# Patient Record
Sex: FEMALE | Race: WHITE | Employment: UNEMPLOYED | URBAN - METROPOLITAN AREA
[De-identification: names, ages, dates, MRNs, and addresses within clinical notes are randomized per-mention and may not be internally consistent; named-entity substitution may affect disease eponyms.]

---

## 2020-08-04 ENCOUNTER — APPOINTMENT (OUTPATIENT)
Dept: GENERAL RADIOLOGY | Age: 35
End: 2020-08-04
Attending: EMERGENCY MEDICINE
Payer: COMMERCIAL

## 2020-08-04 ENCOUNTER — HOSPITAL ENCOUNTER (EMERGENCY)
Age: 35
Discharge: HOME | End: 2020-08-04
Attending: EMERGENCY MEDICINE
Payer: COMMERCIAL

## 2020-08-04 VITALS
WEIGHT: 183 LBS | OXYGEN SATURATION: 100 % | TEMPERATURE: 98.4 F | SYSTOLIC BLOOD PRESSURE: 166 MMHG | DIASTOLIC BLOOD PRESSURE: 92 MMHG | RESPIRATION RATE: 22 BRPM | HEIGHT: 70 IN | HEART RATE: 91 BPM | BODY MASS INDEX: 26.2 KG/M2

## 2020-08-04 DIAGNOSIS — E87.3 METABOLIC ALKALOSIS: ICD-10-CM

## 2020-08-04 DIAGNOSIS — E87.1 HYPONATREMIA: ICD-10-CM

## 2020-08-04 DIAGNOSIS — R11.2 NAUSEA AND VOMITING, INTRACTABILITY OF VOMITING NOT SPECIFIED, UNSPECIFIED VOMITING TYPE: ICD-10-CM

## 2020-08-04 DIAGNOSIS — K31.84 GASTROPARESIS: Primary | ICD-10-CM

## 2020-08-04 LAB
ALBUMIN SERPL-MCNC: 4 G/DL (ref 3.5–5)
ALBUMIN/GLOB SERPL: 0.6 {RATIO} (ref 1.1–2.2)
ALP SERPL-CCNC: 143 U/L (ref 45–117)
ALT SERPL-CCNC: 32 U/L (ref 12–78)
ANION GAP SERPL CALC-SCNC: 9 MMOL/L (ref 5–15)
ARTERIAL PATENCY WRIST A: ABNORMAL
AST SERPL-CCNC: 29 U/L (ref 15–37)
BASE EXCESS BLD CALC-SCNC: 12 MMOL/L
BASOPHILS # BLD: 0 K/UL (ref 0–0.1)
BASOPHILS NFR BLD: 0 % (ref 0–1)
BDY SITE: ABNORMAL
BILIRUB SERPL-MCNC: 1.1 MG/DL (ref 0.2–1)
BUN SERPL-MCNC: 31 MG/DL (ref 6–20)
BUN/CREAT SERPL: 17 (ref 12–20)
CA-I BLD-SCNC: 1.07 MMOL/L (ref 1.12–1.32)
CALCIUM SERPL-MCNC: 10.4 MG/DL (ref 8.5–10.1)
CHLORIDE SERPL-SCNC: 89 MMOL/L (ref 97–108)
CO2 SERPL-SCNC: 30 MMOL/L (ref 21–32)
CREAT SERPL-MCNC: 1.8 MG/DL (ref 0.55–1.02)
DIFFERENTIAL METHOD BLD: ABNORMAL
EOSINOPHIL # BLD: 0 K/UL (ref 0–0.4)
EOSINOPHIL NFR BLD: 0 % (ref 0–7)
ERYTHROCYTE [DISTWIDTH] IN BLOOD BY AUTOMATED COUNT: 13.2 % (ref 11.5–14.5)
GAS FLOW.O2 O2 DELIVERY SYS: ABNORMAL L/MIN
GLOBULIN SER CALC-MCNC: 6.2 G/DL (ref 2–4)
GLUCOSE BLD STRIP.AUTO-MCNC: 269 MG/DL (ref 65–100)
GLUCOSE SERPL-MCNC: 260 MG/DL (ref 65–100)
HCO3 BLD-SCNC: 33.3 MMOL/L (ref 22–26)
HCT VFR BLD AUTO: 39.2 % (ref 35–47)
HGB BLD-MCNC: 13.5 G/DL (ref 11.5–16)
IMM GRANULOCYTES # BLD AUTO: 0.1 K/UL (ref 0–0.04)
IMM GRANULOCYTES NFR BLD AUTO: 1 % (ref 0–0.5)
INR PPP: 1 (ref 0.9–1.1)
LACTATE SERPL-SCNC: 2.8 MMOL/L (ref 0.4–2)
LIPASE SERPL-CCNC: 57 U/L (ref 73–393)
LYMPHOCYTES # BLD: 1.5 K/UL (ref 0.8–3.5)
LYMPHOCYTES NFR BLD: 15 % (ref 12–49)
MCH RBC QN AUTO: 30.4 PG (ref 26–34)
MCHC RBC AUTO-ENTMCNC: 34.4 G/DL (ref 30–36.5)
MCV RBC AUTO: 88.3 FL (ref 80–99)
MONOCYTES # BLD: 0.5 K/UL (ref 0–1)
MONOCYTES NFR BLD: 5 % (ref 5–13)
NEUTS SEG # BLD: 7.9 K/UL (ref 1.8–8)
NEUTS SEG NFR BLD: 79 % (ref 32–75)
NRBC # BLD: 0 K/UL (ref 0–0.01)
NRBC BLD-RTO: 0 PER 100 WBC
PCO2 BLD: 32 MMHG (ref 35–45)
PH BLD: 7.63 [PH] (ref 7.35–7.45)
PLATELET # BLD AUTO: 524 K/UL (ref 150–400)
PMV BLD AUTO: 9.4 FL (ref 8.9–12.9)
PO2 BLD: 25 MMHG (ref 80–100)
POTASSIUM SERPL-SCNC: 4 MMOL/L (ref 3.5–5.1)
PROT SERPL-MCNC: 10.2 G/DL (ref 6.4–8.2)
PROTHROMBIN TIME: 10.9 SEC (ref 9–11.1)
RBC # BLD AUTO: 4.44 M/UL (ref 3.8–5.2)
SAO2 % BLD: 61 % (ref 92–97)
SERVICE CMNT-IMP: ABNORMAL
SODIUM SERPL-SCNC: 128 MMOL/L (ref 136–145)
SPECIMEN TYPE: ABNORMAL
TOTAL RESP. RATE, ITRR: 29
TROPONIN I SERPL-MCNC: <0.05 NG/ML
WBC # BLD AUTO: 10 K/UL (ref 3.6–11)

## 2020-08-04 PROCEDURE — 82803 BLOOD GASES ANY COMBINATION: CPT

## 2020-08-04 PROCEDURE — 82962 GLUCOSE BLOOD TEST: CPT

## 2020-08-04 PROCEDURE — 74011000250 HC RX REV CODE- 250: Performed by: EMERGENCY MEDICINE

## 2020-08-04 PROCEDURE — 74011250636 HC RX REV CODE- 250/636

## 2020-08-04 PROCEDURE — 74011250636 HC RX REV CODE- 250/636: Performed by: EMERGENCY MEDICINE

## 2020-08-04 PROCEDURE — 96361 HYDRATE IV INFUSION ADD-ON: CPT

## 2020-08-04 PROCEDURE — 99285 EMERGENCY DEPT VISIT HI MDM: CPT

## 2020-08-04 PROCEDURE — 74011250637 HC RX REV CODE- 250/637: Performed by: EMERGENCY MEDICINE

## 2020-08-04 PROCEDURE — 83690 ASSAY OF LIPASE: CPT

## 2020-08-04 PROCEDURE — 85610 PROTHROMBIN TIME: CPT

## 2020-08-04 PROCEDURE — 96374 THER/PROPH/DIAG INJ IV PUSH: CPT

## 2020-08-04 PROCEDURE — 36415 COLL VENOUS BLD VENIPUNCTURE: CPT

## 2020-08-04 PROCEDURE — 85025 COMPLETE CBC W/AUTO DIFF WBC: CPT

## 2020-08-04 PROCEDURE — 83605 ASSAY OF LACTIC ACID: CPT

## 2020-08-04 PROCEDURE — 71045 X-RAY EXAM CHEST 1 VIEW: CPT

## 2020-08-04 PROCEDURE — 84484 ASSAY OF TROPONIN QUANT: CPT

## 2020-08-04 PROCEDURE — 93005 ELECTROCARDIOGRAM TRACING: CPT

## 2020-08-04 PROCEDURE — C9113 INJ PANTOPRAZOLE SODIUM, VIA: HCPCS | Performed by: EMERGENCY MEDICINE

## 2020-08-04 PROCEDURE — 80053 COMPREHEN METABOLIC PANEL: CPT

## 2020-08-04 PROCEDURE — 96372 THER/PROPH/DIAG INJ SC/IM: CPT

## 2020-08-04 RX ORDER — CLONIDINE HYDROCHLORIDE 0.1 MG/1
0.1 TABLET ORAL 2 TIMES DAILY
COMMUNITY

## 2020-08-04 RX ORDER — PANTOPRAZOLE SODIUM 40 MG/1
40 TABLET, DELAYED RELEASE ORAL DAILY
COMMUNITY

## 2020-08-04 RX ORDER — HALOPERIDOL 5 MG/ML
INJECTION INTRAMUSCULAR
Status: COMPLETED
Start: 2020-08-04 | End: 2020-08-04

## 2020-08-04 RX ORDER — FAMOTIDINE 20 MG/1
20 TABLET, FILM COATED ORAL
Status: DISCONTINUED | OUTPATIENT
Start: 2020-08-04 | End: 2020-08-04 | Stop reason: HOSPADM

## 2020-08-04 RX ORDER — INSULIN ASPART 100 [IU]/ML
INJECTION, SOLUTION INTRAVENOUS; SUBCUTANEOUS
COMMUNITY

## 2020-08-04 RX ORDER — METOCLOPRAMIDE 10 MG/1
10 TABLET ORAL
COMMUNITY

## 2020-08-04 RX ORDER — PROMETHAZINE HYDROCHLORIDE 50 MG/1
50 SUPPOSITORY RECTAL
Qty: 12 SUPPOSITORY | Refills: 0 | Status: SHIPPED | OUTPATIENT
Start: 2020-08-04

## 2020-08-04 RX ORDER — HALOPERIDOL 5 MG/ML
5 INJECTION INTRAMUSCULAR
Status: COMPLETED | OUTPATIENT
Start: 2020-08-04 | End: 2020-08-04

## 2020-08-04 RX ORDER — INSULIN GLARGINE 100 [IU]/ML
INJECTION, SOLUTION SUBCUTANEOUS
COMMUNITY

## 2020-08-04 RX ADMIN — SODIUM CHLORIDE 1000 ML: 900 INJECTION, SOLUTION INTRAVENOUS at 14:16

## 2020-08-04 RX ADMIN — HALOPERIDOL 5 MG: 5 INJECTION INTRAMUSCULAR at 14:12

## 2020-08-04 RX ADMIN — HALOPERIDOL LACTATE 5 MG: 5 INJECTION, SOLUTION INTRAMUSCULAR at 14:12

## 2020-08-04 RX ADMIN — SODIUM CHLORIDE 40 MG: 9 INJECTION, SOLUTION INTRAMUSCULAR; INTRAVENOUS; SUBCUTANEOUS at 14:17

## 2020-08-04 NOTE — ED NOTES
Pt arrives via EMS from home with c/o ABD pain, N/V/D, and hyperglycemia since yesterday afternoon. Per EMS pt was recently d/c from [facility] 2 days ago and was admitted for cellulitis and DKA. Pt states she used meth yesterday morning.  on arrival. Pt placed x3 on monitor, bed in low position, call bell in reach. Pt a/o x4.    4966: Bedside and Verbal shift change report given to Maria Guadalupe Davis RN (oncoming nurse) by Marni Pelayo RN (offgoing nurse). Report included the following information SBAR, Kardex, ED Summary, [facility] and Recent Results.

## 2020-08-04 NOTE — DISCHARGE INSTRUCTIONS
Patient Education        Gastroparesis: Care Instructions  Your Care Instructions     When you have gastroparesis, your stomach takes a lot longer to empty. This delay can cause belly pain, bloating, and belching. It also can cause hiccups, heartburn, nausea or vomiting. You may not feel like eating. These symptoms may come and go. They most often occur during and after meals. You may feel full after only a few bites of food. This condition occurs when the nerves to the stomach don't work properly. Diabetes is the most common cause of this nerve damage. Gastroparesis can make it harder to control your blood sugar levels. But keeping your blood sugar levels under control may help with your symptoms. Parkinson's disease, stroke, and some medicines can also cause this condition. Home treatment can often help. Follow-up care is a key part of your treatment and safety. Be sure to make and go to all appointments, and call your doctor if you are having problems. It's also a good idea to know your test results and keep a list of the medicines you take. How can you care for yourself at home? · Eat several small meals each day rather than three large meals. · Eat foods that are low in fiber and fat. · If your doctor suggests it, take medicines that help the stomach empty more quickly. These are called motility agents. When should you call for help? Call your doctor now or seek immediate medical care if:  · You are vomiting. · You have new or worse belly pain. · You have a fever. · You cannot pass stools or gas. Watch closely for changes in your health, and be sure to contact your doctor if you have any problems. Where can you learn more? Go to http://chip-gunner.info/  Enter M106 in the search box to learn more about \"Gastroparesis: Care Instructions. \"  Current as of: August 12, 2019               Content Version: 12.5  © 8451-1096 [employer].    Care instructions adapted under license by [employer] (which disclaims liability or warranty for this information). If you have questions about a medical condition or this instruction, always ask your healthcare professional. [address] any warranty or liability for your use of this information. Patient Education        Nausea and Vomiting: Care Instructions  Your Care Instructions     When you are nauseated, you may feel weak and sweaty and notice a lot of saliva in your mouth. Nausea often leads to vomiting. Most of the time you do not need to worry about nausea and vomiting, but they can be signs of other illnesses. Two common causes of nausea and vomiting are stomach flu and food poisoning. Nausea and vomiting from viral stomach flu will usually start to improve within 24 hours. Nausea and vomiting from food poisoning may last from 12 to 48 hours. The doctor has checked you carefully, but problems can develop later. If you notice any problems or new symptoms, get medical treatment right away. Follow-up care is a key part of your treatment and safety. Be sure to make and go to all appointments, and call your doctor if you are having problems. It's also a good idea to know your test results and keep a list of the medicines you take. How can you care for yourself at home? · To prevent dehydration, drink plenty of fluids, enough so that your urine is light yellow or clear like water. Choose water and other caffeine-free clear liquids until you feel better. If you have kidney, heart, or liver disease and have to limit fluids, talk with your doctor before you increase the amount of fluids you drink. · Rest in bed until you feel better. · When you are able to eat, try clear soups, mild foods, and liquids until all symptoms are gone for 12 to 48 hours. Other good choices include dry toast, crackers, cooked cereal, and gelatin dessert, such as Jell-O. When should you call for help?    [id number] anytime you think you may need emergency care. For example, call if:  · You passed out (lost consciousness). Call your doctor now or seek immediate medical care if:  · You have symptoms of dehydration, such as:  ? Dry eyes and a dry mouth. ? Passing only a little dark urine. ? Feeling thirstier than usual.  · You have new or worsening belly pain. · You have a new or higher fever. · You vomit blood or what looks like coffee grounds. Watch closely for changes in your health, and be sure to contact your doctor if:  · You have ongoing nausea and vomiting. · Your vomiting is getting worse. · Your vomiting lasts longer than 2 days. · You are not getting better as expected. Where can you learn more? Go to http://chip-gunner.info/  Enter H591 in the search box to learn more about \"Nausea and Vomiting: Care Instructions. \"  Current as of: June 26, 2019               Content Version: 12.5  © 3983-3515 [employer]. Care instructions adapted under license by [employer] (which disclaims liability or warranty for this information). If you have questions about a medical condition or this instruction, always ask your healthcare professional. [address] any warranty or liability for your use of this information. Thank you! Thank you for allowing me to care for you in the emergency department. I sincerely hope that you are satisfied with your visit today. It is my goal to provide you with excellent care. Below you will find a list of your labs and imaging from your visit today. Should you have any questions regarding these results please do not hesitate to call the emergency department.     Labs -     Recent Results (from the past 12 hour(s))   GLUCOSE, POC    Collection Time: 08/04/20  1:49 PM   Result Value Ref Range    Glucose (POC) 269 (H) 65 - 100 mg/dL    Performed by Kiera Hood    CBC WITH AUTOMATED DIFF    Collection Time: 08/04/20  1:53 PM   Result Value Ref Range    WBC 10.0 3.6 - 11.0 K/uL    RBC 4.44 3.80 - 5.20 M/uL    HGB 13.5 11.5 - 16.0 g/dL    HCT 39.2 35.0 - 47.0 %    MCV 88.3 80.0 - 99.0 FL    MCH 30.4 26.0 - 34.0 PG    MCHC 34.4 30.0 - 36.5 g/dL    RDW 13.2 11.5 - 14.5 %    PLATELET 939 (H) 787 - 400 K/uL    MPV 9.4 8.9 - 12.9 FL    NRBC 0.0 0  WBC    ABSOLUTE NRBC 0.00 0.00 - 0.01 K/uL    NEUTROPHILS 79 (H) 32 - 75 %    LYMPHOCYTES 15 12 - 49 %    MONOCYTES 5 5 - 13 %    EOSINOPHILS 0 0 - 7 %    BASOPHILS 0 0 - 1 %    IMMATURE GRANULOCYTES 1 (H) 0.0 - 0.5 %    ABS. NEUTROPHILS 7.9 1.8 - 8.0 K/UL    ABS. LYMPHOCYTES 1.5 0.8 - 3.5 K/UL    ABS. MONOCYTES 0.5 0.0 - 1.0 K/UL    ABS. EOSINOPHILS 0.0 0.0 - 0.4 K/UL    ABS. BASOPHILS 0.0 0.0 - 0.1 K/UL    ABS. IMM. GRANS. 0.1 (H) 0.00 - 0.04 K/UL    DF AUTOMATED     METABOLIC PANEL, COMPREHENSIVE    Collection Time: 08/04/20  1:53 PM   Result Value Ref Range    Sodium 128 (L) 136 - 145 mmol/L    Potassium 4.0 3.5 - 5.1 mmol/L    Chloride 89 (L) 97 - 108 mmol/L    CO2 30 21 - 32 mmol/L    Anion gap 9 5 - 15 mmol/L    Glucose 260 (H) 65 - 100 mg/dL    BUN 31 (H) 6 - 20 MG/DL    Creatinine 1.80 (H) 0.55 - 1.02 MG/DL    BUN/Creatinine ratio 17 12 - 20      GFR est AA 39 (L) >60 ml/min/1.73m2    GFR est non-AA 32 (L) >60 ml/min/1.73m2    Calcium 10.4 (H) 8.5 - 10.1 MG/DL    Bilirubin, total 1.1 (H) 0.2 - 1.0 MG/DL    ALT (SGPT) 32 12 - 78 U/L    AST (SGOT) 29 15 - 37 U/L    Alk.  phosphatase 143 (H) 45 - 117 U/L    Protein, total 10.2 (H) 6.4 - 8.2 g/dL    Albumin 4.0 3.5 - 5.0 g/dL    Globulin 6.2 (H) 2.0 - 4.0 g/dL    A-G Ratio 0.6 (L) 1.1 - 2.2     LACTIC ACID    Collection Time: 08/04/20  1:53 PM   Result Value Ref Range    Lactic acid 2.8 (HH) 0.4 - 2.0 MMOL/L   LIPASE    Collection Time: 08/04/20  1:53 PM   Result Value Ref Range    Lipase 57 (L) 73 - 393 U/L   TROPONIN I    Collection Time: 08/04/20  1:53 PM   Result Value Ref Range    Troponin-I, Qt. <0.05 <0.05 ng/mL PROTHROMBIN TIME + INR    Collection Time: 08/04/20  1:53 PM   Result Value Ref Range    INR 1.0 0.9 - 1.1      Prothrombin time 10.9 9.0 - 11.1 sec   POC EG7    Collection Time: 08/04/20  3:05 PM   Result Value Ref Range    Calcium, ionized (POC) 1.07 (L) 1.12 - 1.32 mmol/L    pH (POC) 7.63 (HH) 7.35 - 7.45      pCO2 (POC) 32.0 (L) 35.0 - 45.0 MMHG    pO2 (POC) 25 (LL) 80 - 100 MMHG    HCO3 (POC) 33.3 (H) 22 - 26 MMOL/L    Base excess (POC) 12 mmol/L    sO2 (POC) 61 (L) 92 - 97 %    Site OTHER      Device: ROOM AIR      Allens test (POC) N/A      Specimen type (POC) VENOUS BLOOD      Total resp. rate 29     EKG, 12 LEAD, INITIAL    Collection Time: 08/04/20  3:18 PM   Result Value Ref Range    Ventricular Rate 89 BPM    Atrial Rate 89 BPM    P-R Interval 166 ms    QRS Duration 82 ms    Q-T Interval 398 ms    QTC Calculation (Bezet) 484 ms    Calculated P Axis 61 degrees    Calculated R Axis 67 degrees    Calculated T Axis 40 degrees    Diagnosis       Normal sinus rhythm  Possible Left atrial enlargement  Prolonged QT  No previous ECGs available         Radiologic Studies -   XR CHEST PORT   Final Result   IMPRESSION: No acute changes. CT Results  (Last 48 hours)    None        CXR Results  (Last 48 hours)               08/04/20 1425  XR CHEST PORT Final result    Impression:  IMPRESSION: No acute changes. Narrative:  Clinical indication: Chest pain. Portable AP upright view of the chest is obtained, no prior. The a heart size is   normal. There is no acute infiltrate. If you feel that you have not received excellent quality care or timely care, please ask to speak to the nurse manager. Please choose us in the future for your continued health care needs.    ------------------------------------------------------------------------------------------------------------  The exam and treatment you received in the Emergency Department were for an urgent problem and are not intended as complete care. It is important that you follow up with a doctor, nurse practitioner, or physician assistant for ongoing care. If your symptoms become worse or you do not improve as expected and you are unable to reach your usual health care provider, you should return to the Emergency Department. We are available 24 hours a day. Please take your discharge instructions with you when you go to your follow-up appointment. If you have any problem arranging a follow-up appointment, contact the Emergency Department immediately. If a prescription has been provided, please have it filled as soon as possible to prevent a delay in treatment. Read the entire medication instruction sheet provided to you by the pharmacy. If you have any questions or reservations about taking the medication due to side effects or interactions with other medications, please call your primary care physician or contact the ER to speak with the charge nurse. Make an appointment with your family doctor or the physician you were referred to for follow-up of this visit as instructed on your discharge paperwork, as this is mandatory follow-up. Return to the ER if you are unable to be seen or if you are unable to be seen in a timely manner. If you have any problem arranging the follow-up visit, contact the Emergency Department immediately.

## 2020-08-04 NOTE — ED PROVIDER NOTES
EMERGENCY DEPARTMENT HISTORY AND PHYSICAL EXAM      Date: 8/4/2020  Patient Name: Neil Venegas    Please note that this dictation was completed with [employer], the computer voice recognition software. Quite often unanticipated grammatical, syntax, homophones, and other interpretive errors are inadvertently transcribed by the computer software. Please disregard these errors. Please excuse any errors that have escaped final proofreading. History of Presenting Illness     Chief Complaint   Patient presents with    Abdominal Pain     pt arrives via EMS from home with c/o ABD pain, N/V, and hi blood sugar sine yesterday afternoon. per EMS pt was reently d/c from [facility] for cellutlities and DKA. ot BG on arrival 269. pt states she used meth yesterday morning. pt states she has hx of stomach ulcers and pancreatitis. History Provided By: Patient     HPI: Neil Venegas, 37 y.o. female, with a past medical history significant for polysubstance abuse, diabetes presenting the emergency department complaining of nausea, vomiting, abdominal pain. Patient reports she was just discharged from [facility] for the same. Reports that she uses methamphetamine regularly. She reports last use was yesterday morning. Reports bilious emesis flecked with blood since yesterday. Rates her pain is severe. Blood sugar was noted to be elevated prehospital in the 300s, was 296 here. Denies chance of pregnancy. Nothing makes her nausea and vomiting better. Nothing makes it worse. Denies any fevers or chills. Does report pain radiating up into her chest from the epigastrium of her abdomen. Denies any recent surgeries. No other exacerbating relieving factors or associated symptoms at this time      PCP: Charmaine Ewing MD    No current facility-administered medications on file prior to encounter.       Current Outpatient Medications on File Prior to Encounter   Medication Sig Dispense Refill    insulin aspart U-100 (NovoLOG U-100 Insulin aspart) 100 unit/mL injection by SubCUTAneous route.  insulin glargine (Lantus U-100 Insulin) 100 unit/mL injection by SubCUTAneous route nightly.  cloNIDine HCL (CATAPRES) 0.1 mg tablet Take 0.1 mg by mouth two (2) times a day.  metoclopramide HCl (Reglan) 10 mg tablet Take 10 mg by mouth Before breakfast, lunch, dinner and at bedtime.  pantoprazole (Protonix) 40 mg tablet Take 40 mg by mouth daily. Past History     Past Medical History:  Past Medical History:   Diagnosis Date    Diabetes ([facility] [address]     Hepatitis C infection     Hypertension     Liver disease     PUD (peptic ulcer disease)        Past Surgical History:  Past Surgical History:   Procedure Laterality Date    HX ORTHOPAEDIC  2012    left shoulder     HX ORTHOPAEDIC      left knee and right knee       Family History:  History reviewed. No pertinent family history. Social History:  Social History     Tobacco Use    Smoking status: Current Every Day Smoker     Packs/day: 1.00    Smokeless tobacco: Never Used   Substance Use Topics    Alcohol use: Not Currently    Drug use: Yes     Types: Marijuana, Methamphetamines, Heroin     Comment: last used meth 8/3/20       Allergies: Allergies   Allergen Reactions    Amoxicillin Other (comments)    Tylenol [Acetaminophen] Other (comments)     Pt states she has hep C and cannot take         Review of Systems   Review of Systems   Constitutional: Negative for chills and fever. HENT: Negative for congestion and sore throat. Eyes: Negative for visual disturbance. Respiratory: Negative for cough and shortness of breath. Cardiovascular: Positive for chest pain. Negative for leg swelling. Gastrointestinal: Positive for abdominal pain, nausea and vomiting. Negative for blood in stool and diarrhea. Endocrine: Negative for polyuria. Genitourinary: Negative for dysuria, flank pain, vaginal bleeding and vaginal discharge. Musculoskeletal: Negative for myalgias. Skin: Negative for rash. Allergic/Immunologic: Negative for immunocompromised state. Neurological: Negative for weakness and headaches. Psychiatric/Behavioral: Negative for confusion. Physical Exam   Physical Exam  Vitals signs and nursing note reviewed. Constitutional:       Appearance: She is well-developed. Comments: Disheveled appearing female, mild distress   HENT:      Head: Normocephalic and atraumatic. Mouth/Throat:      Mouth: Mucous membranes are dry. Eyes:      General:         Right eye: No discharge. Left eye: No discharge. Conjunctiva/sclera: Conjunctivae normal.      Pupils: Pupils are equal, round, and reactive to light. Neck:      Musculoskeletal: Normal range of motion and neck supple. Trachea: No tracheal deviation. Cardiovascular:      Rate and Rhythm: Regular rhythm. Tachycardia present. Heart sounds: Normal heart sounds. No murmur. Pulmonary:      Effort: Pulmonary effort is normal. No respiratory distress. Breath sounds: Normal breath sounds. No wheezing or rales. Abdominal:      General: Bowel sounds are normal.      Palpations: Abdomen is soft. Tenderness: There is abdominal tenderness (mild epigastric ttp). There is no guarding or rebound. Musculoskeletal: Normal range of motion. General: No tenderness or deformity. Skin:     General: Skin is warm and dry. Findings: No erythema or rash. Comments: Multiple IV sites sticks in the bilateral [facility]   Neurological:      Mental Status: She is alert and oriented to person, place, and time.    Psychiatric:         Behavior: Behavior normal.         Diagnostic Study Results     Labs -     Recent Results (from the past 12 hour(s))   GLUCOSE, POC    Collection Time: 08/04/20  1:49 PM   Result Value Ref Range    Glucose (POC) 269 (H) 65 - 100 mg/dL    Performed by Erica Diaz    CBC WITH AUTOMATED DIFF    Collection Time: 08/04/20  1:53 PM   Result Value Ref Range    WBC 10.0 3.6 - 11.0 K/uL    RBC 4.44 3.80 - 5.20 M/uL    HGB 13.5 11.5 - 16.0 g/dL    HCT 39.2 35.0 - 47.0 %    MCV 88.3 80.0 - 99.0 FL    MCH 30.4 26.0 - 34.0 PG    MCHC 34.4 30.0 - 36.5 g/dL    RDW 13.2 11.5 - 14.5 %    PLATELET 877 (H) 961 - 400 K/uL    MPV 9.4 8.9 - 12.9 FL    NRBC 0.0 0  WBC    ABSOLUTE NRBC 0.00 0.00 - 0.01 K/uL    NEUTROPHILS 79 (H) 32 - 75 %    LYMPHOCYTES 15 12 - 49 %    MONOCYTES 5 5 - 13 %    EOSINOPHILS 0 0 - 7 %    BASOPHILS 0 0 - 1 %    IMMATURE GRANULOCYTES 1 (H) 0.0 - 0.5 %    ABS. NEUTROPHILS 7.9 1.8 - 8.0 K/UL    ABS. LYMPHOCYTES 1.5 0.8 - 3.5 K/UL    ABS. MONOCYTES 0.5 0.0 - 1.0 K/UL    ABS. EOSINOPHILS 0.0 0.0 - 0.4 K/UL    ABS. BASOPHILS 0.0 0.0 - 0.1 K/UL    ABS. IMM. GRANS. 0.1 (H) 0.00 - 0.04 K/UL    DF AUTOMATED     METABOLIC PANEL, COMPREHENSIVE    Collection Time: 08/04/20  1:53 PM   Result Value Ref Range    Sodium 128 (L) 136 - 145 mmol/L    Potassium 4.0 3.5 - 5.1 mmol/L    Chloride 89 (L) 97 - 108 mmol/L    CO2 30 21 - 32 mmol/L    Anion gap 9 5 - 15 mmol/L    Glucose 260 (H) 65 - 100 mg/dL    BUN 31 (H) 6 - 20 MG/DL    Creatinine 1.80 (H) 0.55 - 1.02 MG/DL    BUN/Creatinine ratio 17 12 - 20      GFR est AA 39 (L) >60 ml/min/1.73m2    GFR est non-AA 32 (L) >60 ml/min/1.73m2    Calcium 10.4 (H) 8.5 - 10.1 MG/DL    Bilirubin, total 1.1 (H) 0.2 - 1.0 MG/DL    ALT (SGPT) 32 12 - 78 U/L    AST (SGOT) 29 15 - 37 U/L    Alk.  phosphatase 143 (H) 45 - 117 U/L    Protein, total 10.2 (H) 6.4 - 8.2 g/dL    Albumin 4.0 3.5 - 5.0 g/dL    Globulin 6.2 (H) 2.0 - 4.0 g/dL    A-G Ratio 0.6 (L) 1.1 - 2.2     LACTIC ACID    Collection Time: 08/04/20  1:53 PM   Result Value Ref Range    Lactic acid 2.8 (HH) 0.4 - 2.0 MMOL/L   LIPASE    Collection Time: 08/04/20  1:53 PM   Result Value Ref Range    Lipase 57 (L) 73 - 393 U/L   TROPONIN I    Collection Time: 08/04/20  1:53 PM   Result Value Ref Range    Troponin-I, Qt. <0.05 <0.05 ng/mL   PROTHROMBIN TIME + INR    Collection Time: 08/04/20  1:53 PM   Result Value Ref Range    INR 1.0 0.9 - 1.1      Prothrombin time 10.9 9.0 - 11.1 sec   POC EG7    Collection Time: 08/04/20  3:05 PM   Result Value Ref Range    Calcium, ionized (POC) 1.07 (L) 1.12 - 1.32 mmol/L    pH (POC) 7.63 (HH) 7.35 - 7.45      pCO2 (POC) 32.0 (L) 35.0 - 45.0 MMHG    pO2 (POC) 25 (LL) 80 - 100 MMHG    HCO3 (POC) 33.3 (H) 22 - 26 MMOL/L    Base excess (POC) 12 mmol/L    sO2 (POC) 61 (L) 92 - 97 %    Site OTHER      Device: ROOM AIR      Allens test (POC) N/A      Specimen type (POC) VENOUS BLOOD      Total resp. rate 29     EKG, 12 LEAD, INITIAL    Collection Time: 08/04/20  3:18 PM   Result Value Ref Range    Ventricular Rate 89 BPM    Atrial Rate 89 BPM    P-R Interval 166 ms    QRS Duration 82 ms    Q-T Interval 398 ms    QTC Calculation (Bezet) 484 ms    Calculated P Axis 61 degrees    Calculated R Axis 67 degrees    Calculated T Axis 40 degrees    Diagnosis       Normal sinus rhythm  Possible Left atrial enlargement  Prolonged QT  No previous ECGs available         Radiologic Studies -   XR CHEST PORT   Final Result   IMPRESSION: No acute changes. CT Results  (Last 48 hours)    None        CXR Results  (Last 48 hours)               08/04/20 1425  XR CHEST PORT Final result    Impression:  IMPRESSION: No acute changes. Narrative:  Clinical indication: Chest pain. Portable AP upright view of the chest is obtained, no prior. The a heart size is   normal. There is no acute infiltrate. Medical Decision Making   I am the first provider for this patient. I reviewed the vital signs, available nursing notes, past medical history, past surgical history, family history and social history. Vital Signs-Reviewed the patient's vital signs.   Patient Vitals for the past 12 hrs:   Temp Pulse Resp BP SpO2   08/04/20 1357 98.4 °F (36.9 °C) 91 22 (!) 166/92 100 %           Records Reviewed: Nursing notes, Prior visits     Provider Notes (Medical Decision Making):   Patient looks uncomfortable, but not toxic. She has some bilious emesis flecked with some coffee grounds. Will check an H&H. Will provide some IV Protonix. Will provide some antiemetics. Will provide some IV fluids. Disposition pending laboratory work-up. At this time based off the history and physical exam I am not convinced that any imaging is necessary, will reevaluate patient to decide if further imaging of the abdomen and pelvis is necessary. Doubt ACS, doubt PE, doubt pneumonia, doubt pneumothorax. Bleeding most likely from Althea-Whipple tear or gastritis or peptic ulcer disease. ED Course:   Initial assessment performed. The patients presenting problems have been discussed, and they are in agreement with the care plan formulated and outlined with them. I have encouraged them to ask questions as they arise throughout their visit. ED Course as of Aug 05 0014   Tue Aug 04, 2020   1602 Abdominal exam reveals no significant tenderness. Labs show a metabolic alkalosis likely secondary to continued vomiting. It looks as though the labs show evidence of dehydration with low sodium, low chloride.    [AR]      ED Course User Index  [AR] Martin Gonzales DO             Critical Care Time:   none    Disposition:  DISCHARGE NOTE  Patients results have been reviewed with them. Patient and/or family have verbally conveyed their understanding and agreement of the patient's signs, symptoms, diagnosis, treatment and prognosis and additionally agree to follow up as recommended or return to the Emergency Room should their condition change or have any new concerns prior to their follow-up appointment. Patient verbally agrees with the care-plan and verbally conveys that all of their questions have been answered.    Discharge instructions have also been provided to the patient with some educational information regarding their diagnosis as well a list of reasons why they would want to return to the ER prior to their follow-up appointment should their condition change. PLAN:  1. Discharge Medication List as of 8/4/2020  4:28 PM        2. Follow-up Information     Follow up With Specialties Details Why Contact Info    [facility] EMERGENCY DEPT Emergency Medicine  If symptoms worsen [address] [place] [address]  [phone]    Jag Whitley MD Gastroenterology, Gastroenterology Schedule an appointment as soon as possible for a visit  [address] [place] [address]  [address]  [phone]            Return to ED if worse     Diagnosis     Clinical Impression:   1. Gastroparesis    2. Nausea and vomiting, intractability of vomiting not specified, unspecified vomiting type    3. Hyponatremia    4. Metabolic alkalosis        Attestations:   This note was completed by Lexie Duckworth DO

## 2020-08-05 LAB
ATRIAL RATE: 89 BPM
CALCULATED P AXIS, ECG09: 61 DEGREES
CALCULATED R AXIS, ECG10: 67 DEGREES
CALCULATED T AXIS, ECG11: 40 DEGREES
DIAGNOSIS, 93000: NORMAL
P-R INTERVAL, ECG05: 166 MS
Q-T INTERVAL, ECG07: 398 MS
QRS DURATION, ECG06: 82 MS
QTC CALCULATION (BEZET), ECG08: 484 MS
VENTRICULAR RATE, ECG03: 89 BPM